# Patient Record
Sex: FEMALE | Race: OTHER | URBAN - METROPOLITAN AREA
[De-identification: names, ages, dates, MRNs, and addresses within clinical notes are randomized per-mention and may not be internally consistent; named-entity substitution may affect disease eponyms.]

---

## 2017-06-24 ENCOUNTER — EMERGENCY (EMERGENCY)
Facility: HOSPITAL | Age: 52
LOS: 1 days | Discharge: HOME | End: 2017-06-24
Attending: EMERGENCY MEDICINE
Payer: COMMERCIAL

## 2017-06-24 VITALS
SYSTOLIC BLOOD PRESSURE: 126 MMHG | HEART RATE: 49 BPM | OXYGEN SATURATION: 100 % | DIASTOLIC BLOOD PRESSURE: 75 MMHG | RESPIRATION RATE: 16 BRPM | HEIGHT: 62 IN | TEMPERATURE: 98 F | WEIGHT: 126.99 LBS

## 2017-06-24 VITALS — HEART RATE: 64 BPM

## 2017-06-24 DIAGNOSIS — Z98.51 TUBAL LIGATION STATUS: ICD-10-CM

## 2017-06-24 DIAGNOSIS — R42 DIZZINESS AND GIDDINESS: ICD-10-CM

## 2017-06-24 DIAGNOSIS — R11.2 NAUSEA WITH VOMITING, UNSPECIFIED: ICD-10-CM

## 2017-06-24 DIAGNOSIS — Z98.51 TUBAL LIGATION STATUS: Chronic | ICD-10-CM

## 2017-06-24 DIAGNOSIS — E86.0 DEHYDRATION: ICD-10-CM

## 2017-06-24 DIAGNOSIS — I10 ESSENTIAL (PRIMARY) HYPERTENSION: ICD-10-CM

## 2017-06-24 LAB
ACETONE SERPL-MCNC: ABNORMAL
ALBUMIN SERPL ELPH-MCNC: 3.6 G/DL — SIGNIFICANT CHANGE UP (ref 3.5–5)
ALP SERPL-CCNC: 93 U/L — SIGNIFICANT CHANGE UP (ref 40–120)
ALT FLD-CCNC: 30 U/L DA — SIGNIFICANT CHANGE UP (ref 10–60)
ANION GAP SERPL CALC-SCNC: 7 MMOL/L — SIGNIFICANT CHANGE UP (ref 5–17)
AST SERPL-CCNC: 23 U/L — SIGNIFICANT CHANGE UP (ref 10–40)
BASOPHILS # BLD AUTO: 0.1 K/UL — SIGNIFICANT CHANGE UP (ref 0–0.2)
BASOPHILS NFR BLD AUTO: 0.5 % — SIGNIFICANT CHANGE UP (ref 0–2)
BILIRUB SERPL-MCNC: 0.4 MG/DL — SIGNIFICANT CHANGE UP (ref 0.2–1.2)
BUN SERPL-MCNC: 23 MG/DL — HIGH (ref 7–18)
CALCIUM SERPL-MCNC: 8.8 MG/DL — SIGNIFICANT CHANGE UP (ref 8.4–10.5)
CHLORIDE SERPL-SCNC: 109 MMOL/L — HIGH (ref 96–108)
CK MB BLD-MCNC: 1 % — SIGNIFICANT CHANGE UP (ref 0–3.5)
CK MB CFR SERPL CALC: 1.1 NG/ML — SIGNIFICANT CHANGE UP (ref 0–3.6)
CK SERPL-CCNC: 112 U/L — SIGNIFICANT CHANGE UP (ref 21–215)
CO2 SERPL-SCNC: 25 MMOL/L — SIGNIFICANT CHANGE UP (ref 22–31)
CREAT SERPL-MCNC: 0.61 MG/DL — SIGNIFICANT CHANGE UP (ref 0.5–1.3)
EOSINOPHIL # BLD AUTO: 0 K/UL — SIGNIFICANT CHANGE UP (ref 0–0.5)
EOSINOPHIL NFR BLD AUTO: 0.4 % — SIGNIFICANT CHANGE UP (ref 0–6)
GLUCOSE SERPL-MCNC: 124 MG/DL — HIGH (ref 70–99)
HCT VFR BLD CALC: 40.9 % — SIGNIFICANT CHANGE UP (ref 34.5–45)
HGB BLD-MCNC: 13.2 G/DL — SIGNIFICANT CHANGE UP (ref 11.5–15.5)
LYMPHOCYTES # BLD AUTO: 1.7 K/UL — SIGNIFICANT CHANGE UP (ref 1–3.3)
LYMPHOCYTES # BLD AUTO: 13.2 % — SIGNIFICANT CHANGE UP (ref 13–44)
MAGNESIUM SERPL-MCNC: 2.1 MG/DL — SIGNIFICANT CHANGE UP (ref 1.6–2.6)
MCHC RBC-ENTMCNC: 29.8 PG — SIGNIFICANT CHANGE UP (ref 27–34)
MCHC RBC-ENTMCNC: 32.4 GM/DL — SIGNIFICANT CHANGE UP (ref 32–36)
MCV RBC AUTO: 92 FL — SIGNIFICANT CHANGE UP (ref 80–100)
MONOCYTES # BLD AUTO: 0.3 K/UL — SIGNIFICANT CHANGE UP (ref 0–0.9)
MONOCYTES NFR BLD AUTO: 2.5 % — SIGNIFICANT CHANGE UP (ref 2–14)
NEUTROPHILS # BLD AUTO: 10.8 K/UL — HIGH (ref 1.8–7.4)
NEUTROPHILS NFR BLD AUTO: 83.4 % — HIGH (ref 43–77)
PLATELET # BLD AUTO: 259 K/UL — SIGNIFICANT CHANGE UP (ref 150–400)
POTASSIUM SERPL-MCNC: 4 MMOL/L — SIGNIFICANT CHANGE UP (ref 3.5–5.3)
POTASSIUM SERPL-SCNC: 4 MMOL/L — SIGNIFICANT CHANGE UP (ref 3.5–5.3)
PROT SERPL-MCNC: 7.7 G/DL — SIGNIFICANT CHANGE UP (ref 6–8.3)
RBC # BLD: 4.44 M/UL — SIGNIFICANT CHANGE UP (ref 3.8–5.2)
RBC # FLD: 11.9 % — SIGNIFICANT CHANGE UP (ref 10.3–14.5)
SODIUM SERPL-SCNC: 141 MMOL/L — SIGNIFICANT CHANGE UP (ref 135–145)
TROPONIN I SERPL-MCNC: <0.015 NG/ML — SIGNIFICANT CHANGE UP (ref 0–0.04)
WBC # BLD: 12.9 K/UL — HIGH (ref 3.8–10.5)
WBC # FLD AUTO: 12.9 K/UL — HIGH (ref 3.8–10.5)

## 2017-06-24 PROCEDURE — 85027 COMPLETE CBC AUTOMATED: CPT

## 2017-06-24 PROCEDURE — 96374 THER/PROPH/DIAG INJ IV PUSH: CPT

## 2017-06-24 PROCEDURE — 82550 ASSAY OF CK (CPK): CPT

## 2017-06-24 PROCEDURE — 36416 COLLJ CAPILLARY BLOOD SPEC: CPT

## 2017-06-24 PROCEDURE — 99284 EMERGENCY DEPT VISIT MOD MDM: CPT | Mod: 25

## 2017-06-24 PROCEDURE — 82553 CREATINE MB FRACTION: CPT

## 2017-06-24 PROCEDURE — 70450 CT HEAD/BRAIN W/O DYE: CPT | Mod: 26

## 2017-06-24 PROCEDURE — 84484 ASSAY OF TROPONIN QUANT: CPT

## 2017-06-24 PROCEDURE — 99285 EMERGENCY DEPT VISIT HI MDM: CPT

## 2017-06-24 PROCEDURE — 36415 COLL VENOUS BLD VENIPUNCTURE: CPT

## 2017-06-24 PROCEDURE — 70450 CT HEAD/BRAIN W/O DYE: CPT

## 2017-06-24 PROCEDURE — 82009 KETONE BODYS QUAL: CPT

## 2017-06-24 PROCEDURE — 83735 ASSAY OF MAGNESIUM: CPT

## 2017-06-24 PROCEDURE — 80053 COMPREHEN METABOLIC PANEL: CPT

## 2017-06-24 PROCEDURE — 93005 ELECTROCARDIOGRAM TRACING: CPT

## 2017-06-24 RX ORDER — MECLIZINE HCL 12.5 MG
12.5 TABLET ORAL ONCE
Qty: 0 | Refills: 0 | Status: COMPLETED | OUTPATIENT
Start: 2017-06-24 | End: 2017-06-24

## 2017-06-24 RX ORDER — METOCLOPRAMIDE HCL 10 MG
10 TABLET ORAL ONCE
Qty: 0 | Refills: 0 | Status: DISCONTINUED | OUTPATIENT
Start: 2017-06-24 | End: 2017-06-24

## 2017-06-24 RX ORDER — MECLIZINE HCL 12.5 MG
1 TABLET ORAL
Qty: 21 | Refills: 0 | OUTPATIENT
Start: 2017-06-24 | End: 2017-07-01

## 2017-06-24 RX ORDER — SODIUM CHLORIDE 9 MG/ML
1000 INJECTION INTRAMUSCULAR; INTRAVENOUS; SUBCUTANEOUS
Qty: 0 | Refills: 0 | Status: DISCONTINUED | OUTPATIENT
Start: 2017-06-24 | End: 2017-06-28

## 2017-06-24 RX ORDER — SODIUM CHLORIDE 9 MG/ML
1000 INJECTION INTRAMUSCULAR; INTRAVENOUS; SUBCUTANEOUS ONCE
Qty: 0 | Refills: 0 | Status: COMPLETED | OUTPATIENT
Start: 2017-06-24 | End: 2017-06-24

## 2017-06-24 RX ORDER — ONDANSETRON 8 MG/1
1 TABLET, FILM COATED ORAL
Qty: 21 | Refills: 0 | OUTPATIENT
Start: 2017-06-24 | End: 2017-07-01

## 2017-06-24 RX ORDER — METOCLOPRAMIDE HCL 10 MG
10 TABLET ORAL ONCE
Qty: 0 | Refills: 0 | Status: COMPLETED | OUTPATIENT
Start: 2017-06-24 | End: 2017-06-24

## 2017-06-24 RX ADMIN — Medication 10 MILLIGRAM(S): at 12:25

## 2017-06-24 RX ADMIN — SODIUM CHLORIDE 200 MILLILITER(S): 9 INJECTION INTRAMUSCULAR; INTRAVENOUS; SUBCUTANEOUS at 12:25

## 2017-06-24 RX ADMIN — SODIUM CHLORIDE 2000 MILLILITER(S): 9 INJECTION INTRAMUSCULAR; INTRAVENOUS; SUBCUTANEOUS at 14:10

## 2017-06-24 RX ADMIN — Medication 12.5 MILLIGRAM(S): at 12:25

## 2017-06-24 NOTE — ED PROVIDER NOTE - OBJECTIVE STATEMENT
53 y/o F pt w/ PMHx of HTN, hyperthyroidism, and tubal ligation presents to the ED for dizziness since yesterday morning. Pt describes the dizziness as intermittent. Pt also notes 6 episodes of vomiting with nausea, and also notes ataxia. Pt says there is more dizziness with movement. Pt denies fevers/chills, diarrhea, or any other complaints. NKDA.

## 2019-08-21 PROBLEM — E05.90 THYROTOXICOSIS, UNSPECIFIED WITHOUT THYROTOXIC CRISIS OR STORM: Chronic | Status: ACTIVE | Noted: 2017-06-24

## 2019-08-21 PROBLEM — I10 ESSENTIAL (PRIMARY) HYPERTENSION: Chronic | Status: ACTIVE | Noted: 2017-06-24

## 2019-08-29 PROBLEM — Z00.00 ENCOUNTER FOR PREVENTIVE HEALTH EXAMINATION: Status: ACTIVE | Noted: 2019-08-29

## 2019-10-24 ENCOUNTER — APPOINTMENT (OUTPATIENT)
Dept: ENDOCRINOLOGY | Facility: CLINIC | Age: 54
End: 2019-10-24
Payer: MEDICARE

## 2019-10-24 VITALS
WEIGHT: 129 LBS | SYSTOLIC BLOOD PRESSURE: 142 MMHG | HEIGHT: 62 IN | OXYGEN SATURATION: 98 % | DIASTOLIC BLOOD PRESSURE: 77 MMHG | RESPIRATION RATE: 16 BRPM | BODY MASS INDEX: 23.74 KG/M2 | HEART RATE: 60 BPM | TEMPERATURE: 98 F

## 2019-10-24 DIAGNOSIS — Z78.9 OTHER SPECIFIED HEALTH STATUS: ICD-10-CM

## 2019-10-24 DIAGNOSIS — Z86.39 PERSONAL HISTORY OF OTHER ENDOCRINE, NUTRITIONAL AND METABOLIC DISEASE: ICD-10-CM

## 2019-10-24 DIAGNOSIS — Z83.3 FAMILY HISTORY OF DIABETES MELLITUS: ICD-10-CM

## 2019-10-24 DIAGNOSIS — Z83.49 FAMILY HISTORY OF OTHER ENDOCRINE, NUTRITIONAL AND METABOLIC DISEASES: ICD-10-CM

## 2019-10-24 DIAGNOSIS — Z82.49 FAMILY HISTORY OF ISCHEMIC HEART DISEASE AND OTHER DISEASES OF THE CIRCULATORY SYSTEM: ICD-10-CM

## 2019-10-24 DIAGNOSIS — L65.9 NONSCARRING HAIR LOSS, UNSPECIFIED: ICD-10-CM

## 2019-10-24 PROCEDURE — 99204 OFFICE O/P NEW MOD 45 MIN: CPT

## 2019-10-24 RX ORDER — ATENOLOL 50 MG/1
50 TABLET ORAL
Refills: 0 | Status: ACTIVE | COMMUNITY

## 2019-10-24 RX ORDER — AMLODIPINE BESYLATE 5 MG/1
5 TABLET ORAL
Refills: 0 | Status: ACTIVE | COMMUNITY

## 2019-10-24 RX ORDER — METHIMAZOLE 5 MG/1
5 TABLET ORAL
Refills: 0 | Status: ACTIVE | COMMUNITY

## 2019-10-24 NOTE — PHYSICAL EXAM
[Alert] : alert [No Acute Distress] : no acute distress [Well Nourished] : well nourished [Well Developed] : well developed [Normal Sclera/Conjunctiva] : normal sclera/conjunctiva [EOMI] : extra ocular movement intact [No Proptosis] : no proptosis [Normal Oropharynx] : the oropharynx was normal [No Thyroid Nodules] : there were no palpable thyroid nodules [No Respiratory Distress] : no respiratory distress [No Accessory Muscle Use] : no accessory muscle use [Clear to Auscultation] : lungs were clear to auscultation bilaterally [Normal Rate] : heart rate was normal  [Normal S1, S2] : normal S1 and S2 [Regular Rhythm] : with a regular rhythm [Pedal Pulses Normal] : the pedal pulses are present [No Edema] : there was no peripheral edema [Normal Bowel Sounds] : normal bowel sounds [Not Tender] : non-tender [Soft] : abdomen soft [Not Distended] : not distended [No Spinal Tenderness] : no spinal tenderness [Spine Straight] : spine straight [Normal Gait] : normal gait [No Stigmata of Cushings Syndrome] : no stigmata of cushings syndrome [Normal Strength/Tone] : muscle strength and tone were normal [No Rash] : no rash [Normal Reflexes] : deep tendon reflexes were 2+ and symmetric [No Tremors] : no tremors [Oriented x3] : oriented to person, place, and time [Acanthosis Nigricans] : no acanthosis nigricans [de-identified] : right thyroid fullness

## 2019-10-24 NOTE — HISTORY OF PRESENT ILLNESS
[FreeTextEntry1] : 54 year old female with PMH of hyperthyroidism (dx 2006), thyroid nodules, and HTN was referred for management of hyperthyroidism.\par \par She was diagnosed with hyperthyroidism in 2006 and was started on methimazole at that time. She is currently taking Methimazole 5mg PO daily. She reports strict compliance with the medication. No recent TFTs.  She was also diagnosed with thyroid nodules the same year and she had an FNA that she reports as benign. \par \par She reports recent poor appetite and has noted hair thinning. She also has underlying depression and anxiety but denies palpitations, tremors, temperature intolerance, or change in weight. \par \par She reports occasional difficulty swallowing. Denies change in voice or change in neck. \par \par Recent thyroid ultrasound from 5/15/19 as below:\par Right solid isoechoic nodule without microcalcification 1.5 x 0.9 x 0.8cm (TR3)\par Left 0.7 x 0.4 x 0.4cm hypoechoic solid nodule (TR4) without microcalcification and a new 0.4 x 0.2 0.4cm hypoechoic solid nodule (TR4) without microcalcification \par \par Family history- nieces- thyroid nodules. Denies family history of thyroid cancer.

## 2019-10-24 NOTE — ASSESSMENT
[FreeTextEntry1] : 1. Hyperthyroidism\par -likely secondary to Graves disease \par -no recent blood work\par -obtain TFTs and thyroid antibodies now\par -contiue methimazole 5mg PO daily for now\par -side effects of methimazole including hepatotoxicity (jaundice, dark urine, RUQ pain), agranulocytosis (fever, sore throat) and rash discussed with the patient, and patient to call to let me know if any side effects and stop MMI\par -discussed definitive treatment for Graves with patient including thyroidectomy vs BROOKS. Will continue methimazole for now and she will consider these options.\par \par 2. Multiple thyroid nodules\par -The patient has a right lobe isoechoic nodule measuring 1.5cm (TR3) and 2 subcentimeter left sided hypoechoic nodules (measuring up to 0.7cm), both TR4\par -none of these nodules meet TIRADS criteria for FNA at this time\par -will continue to monitor nodules with annual ultrasound \par \par 3. HTN\par BP slightly above goal\par -patient reports variable BP\par -continue amlodipine and atenolol\par -counseled patient on importance of medication compliance \par \par

## 2019-10-24 NOTE — REVIEW OF SYSTEMS
[Fatigue] : fatigue [Dysphagia] : dysphagia [Hair Loss] : hair loss [Depression] : depression [Anxiety] : anxiety [Decreased Appetite] : appetite not decreased [Visual Field Defect] : no visual field defect [Blurry Vision] : no blurred vision [Dysphonia] : no dysphonia [Neck Pain] : no neck pain [Shortness Of Breath] : no shortness of breath [Cough] : no cough [Wheezing] : no wheezing was heard [Nausea] : no nausea [Vomiting] : no vomiting was observed [Constipation] : no constipation [Diarrhea] : no diarrhea [Dysuria] : no dysuria [Incontinence] : no incontinence [Joint Pain] : no joint pain [Joint Stiffness] : no joint stiffness [Muscle Weakness] : no muscle weakness [Muscle Cramps] : no muscle cramps [Dry Skin] : no dry skin [Headache] : no headaches [Tremors] : no tremors [Dizziness] : no dizziness [Cold Intolerance] : cold tolerant [Heat Intolerance] : heat tolerant [Swelling] : no swelling [Lymphadenopathy] : no lymphadenopathy

## 2019-10-24 NOTE — CONSULT LETTER
[Dear  ___] : Dear  [unfilled], [FreeTextEntry1] : \par I had the pleasure of seeing your patient, JAMEE SHINE, in consultation today for hyperthyroidism and thyroid nodules.\par I am including my consult note for your review. If I can be of any assistance, please do not hesitate to contact me. Thank you for allowing me to participate in the care of your patient.\par  \par Sincerely, \par \par Jamee Kumari M.D.\par \par \par

## 2019-10-25 LAB
T4 FREE SERPL-MCNC: 1.2 NG/DL
TSH SERPL-ACNC: 2.19 UIU/ML

## 2019-10-28 LAB
THYROGLOB AB SERPL-ACNC: <20 IU/ML
THYROPEROXIDASE AB SERPL IA-ACNC: 344 IU/ML
TSH RECEPTOR AB: <1.1 IU/L

## 2020-01-24 ENCOUNTER — APPOINTMENT (OUTPATIENT)
Dept: ENDOCRINOLOGY | Facility: CLINIC | Age: 55
End: 2020-01-24
Payer: MEDICARE

## 2020-01-24 VITALS
DIASTOLIC BLOOD PRESSURE: 76 MMHG | WEIGHT: 130 LBS | SYSTOLIC BLOOD PRESSURE: 137 MMHG | BODY MASS INDEX: 23.92 KG/M2 | HEIGHT: 62 IN | RESPIRATION RATE: 16 BRPM | OXYGEN SATURATION: 98 % | HEART RATE: 59 BPM | TEMPERATURE: 97.8 F

## 2020-01-24 DIAGNOSIS — I10 ESSENTIAL (PRIMARY) HYPERTENSION: ICD-10-CM

## 2020-01-24 DIAGNOSIS — E05.90 THYROTOXICOSIS, UNSPECIFIED W/OUT THYROTOXIC CRISIS OR STORM: ICD-10-CM

## 2020-01-24 DIAGNOSIS — E04.2 NONTOXIC MULTINODULAR GOITER: ICD-10-CM

## 2020-01-24 PROCEDURE — 99214 OFFICE O/P EST MOD 30 MIN: CPT

## 2020-01-24 NOTE — HISTORY OF PRESENT ILLNESS
[FreeTextEntry1] : 54 year old female with PMH of hyperthyroidism (dx 2006), thyroid nodules, and HTN presented for follow up of hyperthyroidism.\par \par She was diagnosed with hyperthyroidism in 2006 and was started on methimazole at that time. She is currently taking Methimazole 5mg PO daily. She reports strict compliance with the medication. TFTs in 10/2019 were within normal limits. \par \par She was also diagnosed with thyroid nodules the same year and she had an FNA that she reports as benign. \par \par She reports slight improvement in hair thinning. She also has underlying depression and anxiety but denies palpitations, tremors, temperature intolerance, or change in weight. \par \par She denies dysphagia. Denies change in voice or change in neck. \par \par Recent thyroid ultrasound from 5/15/19 as below:\par Right solid isoechoic nodule without microcalcification 1.5 x 0.9 x 0.8cm (TR3)\par Left 0.7 x 0.4 x 0.4cm hypoechoic solid nodule (TR4) without microcalcification and a new 0.4 x 0.2 0.4cm hypoechoic solid nodule (TR4) without microcalcification \par \par No change in FH or SH since last visit.

## 2020-01-24 NOTE — PHYSICAL EXAM
[Alert] : alert [No Acute Distress] : no acute distress [Well Nourished] : well nourished [Well Developed] : well developed [EOMI] : extra ocular movement intact [No Proptosis] : no proptosis [Normal Sclera/Conjunctiva] : normal sclera/conjunctiva [No Thyroid Nodules] : there were no palpable thyroid nodules [Normal Oropharynx] : the oropharynx was normal [No Accessory Muscle Use] : no accessory muscle use [No Respiratory Distress] : no respiratory distress [Normal Rate] : heart rate was normal  [Clear to Auscultation] : lungs were clear to auscultation bilaterally [Regular Rhythm] : with a regular rhythm [Pedal Pulses Normal] : the pedal pulses are present [Normal S1, S2] : normal S1 and S2 [No Edema] : there was no peripheral edema [Normal Bowel Sounds] : normal bowel sounds [Soft] : abdomen soft [Not Tender] : non-tender [Not Distended] : not distended [No Spinal Tenderness] : no spinal tenderness [Spine Straight] : spine straight [No Stigmata of Cushings Syndrome] : no stigmata of cushings syndrome [Normal Gait] : normal gait [Normal Strength/Tone] : muscle strength and tone were normal [No Rash] : no rash [Normal Reflexes] : deep tendon reflexes were 2+ and symmetric [No Tremors] : no tremors [Oriented x3] : oriented to person, place, and time [Acanthosis Nigricans] : no acanthosis nigricans [de-identified] : right thyroid fullness

## 2020-01-24 NOTE — REVIEW OF SYSTEMS
[Decreased Appetite] : appetite not decreased [Fatigue] : no fatigue [Visual Field Defect] : no visual field defect [Blurry Vision] : no blurred vision [Dysphagia] : no dysphagia [Dysphonia] : no dysphonia [Chest Pain] : no chest pain [Wheezing] : no wheezing was heard [Palpitations] : no palpitations [Shortness Of Breath] : no shortness of breath [Nausea] : no nausea [Cough] : no cough [Constipation] : no constipation [Vomiting] : no vomiting was observed [Diarrhea] : no diarrhea [Dysuria] : no dysuria [Incontinence] : no incontinence [Joint Stiffness] : no joint stiffness [Joint Pain] : no joint pain [Muscle Cramps] : no muscle cramps [Muscle Weakness] : no muscle weakness [Hair Loss] : hair loss [Dry Skin] : no dry skin [Headache] : no headaches [Tremors] : no tremors [Dizziness] : no dizziness [Depression] : no depression [Anxiety] : no anxiety [Cold Intolerance] : cold tolerant [Heat Intolerance] : heat tolerant [Swelling] : no swelling [Lymphadenopathy] : no lymphadenopathy

## 2020-01-24 NOTE — ASSESSMENT
[FreeTextEntry1] : 1. Hyperthyroidism\par -secondary to Graves disease \par -patient is clinically euthyroid\par -repeat TFTs now\par -continue methimazole 5mg PO daily for now\par -side effects of methimazole including hepatotoxicity (jaundice, dark urine, RUQ pain), agranulocytosis (fever, sore throat) and rash discussed with the patient, and patient to call to let me know if any side effects and stop MMI\par -discussed definitive treatment for Graves with patient including thyroidectomy vs BROOKS. Will continue methimazole for now and she will consider these options.\par \par 2. Multiple thyroid nodules\par -The patient has a right lobe isoechoic nodule measuring 1.5cm (TR3) and 2 subcentimeter left sided hypoechoic nodules (measuring up to 0.7cm), both TR4\par -none of these nodules meet TIRADS criteria for FNA at this time\par -will continue to monitor nodules with annual ultrasound (due 5/2020)\par \par 3. HTN\par BP at goal\par -continue amlodipine and atenolol\par -counseled patient on importance of medication compliance \par

## 2020-02-27 ENCOUNTER — APPOINTMENT (OUTPATIENT)
Dept: RHEUMATOLOGY | Facility: CLINIC | Age: 55
End: 2020-02-27

## 2020-05-18 ENCOUNTER — APPOINTMENT (OUTPATIENT)
Dept: ENDOCRINOLOGY | Facility: CLINIC | Age: 55
End: 2020-05-18

## 2024-10-30 NOTE — REASON FOR VISIT
SUBJECTIVE: Huong Trevino is a 52 year old female presenting with a chief complaint of racing/palpating hr with nausea and episode of emesis.  Onset of symptoms was day(s) ago.  Course of illness is improving.      No past medical history on file.  Allergies   Allergen Reactions    Dog Epithelium (Canis Lupus Familiaris) Unknown, Itching, Other (See Comments) and Shortness Of Breath    Keflex [Cephalexin] Anaphylaxis    Vicodin [Hydrocodone-Acetaminophen] Fatigue and GI Disturbance    Amoxicillin-Pot Clavulanate Rash    Modafinil Rash    Pcn [Penicillins] Rash    Sulfa Antibiotics Rash     Social History     Tobacco Use    Smoking status: Never    Smokeless tobacco: Never   Substance Use Topics    Alcohol use: Not on file       ROS:  SKIN: no rash  GI: no vomiting    OBJECTIVE:  BP (!) 160/89 (BP Location: Left arm, Patient Position: Sitting, Cuff Size: Adult Regular)   Pulse 94   Temp 97.8  F (36.6  C) (Tympanic)   Resp 16   Wt 83.9 kg (185 lb)   LMP 05/01/2024 (Approximate)   SpO2 100%   BMI 33.84 kg/m  GENERAL APPEARANCE: healthy, alert and no distress  EYES: EOMI,  PERRL, conjunctiva clear  RESP: lungs clear to auscultation - no rales, rhonchi or wheezes  CV: regular rates and rhythm, normal S1 S2, no murmur noted  SKIN: no suspicious lesions or rashes    EKG  Sinus Rhythm   WITHIN NORMAL LIMITS      ICD-10-CM    1. Lightheadedness  R42 EKG 12-lead complete w/read - Clinics     CBC with Platelets & Differential     Basic metabolic panel      2. Racing heart beat  R00.0 EKG 12-lead complete w/read - Clinics     CBC with Platelets & Differential     Basic metabolic panel      3. Nausea and vomiting, unspecified vomiting type  R11.2 EKG 12-lead complete w/read - Clinics     CBC with Platelets & Differential     Basic metabolic panel     ondansetron (ZOFRAN ODT) 4 MG ODT tab          Fluids/Rest, f/u if worse/not any better    
[Follow-Up: _____] : a [unfilled] follow-up visit